# Patient Record
Sex: FEMALE
[De-identification: names, ages, dates, MRNs, and addresses within clinical notes are randomized per-mention and may not be internally consistent; named-entity substitution may affect disease eponyms.]

---

## 2024-01-05 PROBLEM — Z00.00 ENCOUNTER FOR PREVENTIVE HEALTH EXAMINATION: Status: ACTIVE | Noted: 2024-01-05

## 2024-01-31 ENCOUNTER — APPOINTMENT (OUTPATIENT)
Dept: RHEUMATOLOGY | Facility: CLINIC | Age: 74
End: 2024-01-31

## 2024-04-08 ENCOUNTER — APPOINTMENT (OUTPATIENT)
Dept: VASCULAR SURGERY | Facility: CLINIC | Age: 74
End: 2024-04-08
Payer: MEDICARE

## 2024-04-08 VITALS — BODY MASS INDEX: 17.68 KG/M2 | HEIGHT: 66 IN | WEIGHT: 110 LBS

## 2024-04-08 DIAGNOSIS — J44.9 CHRONIC OBSTRUCTIVE PULMONARY DISEASE, UNSPECIFIED: ICD-10-CM

## 2024-04-08 DIAGNOSIS — I10 ESSENTIAL (PRIMARY) HYPERTENSION: ICD-10-CM

## 2024-04-08 DIAGNOSIS — I73.9 PERIPHERAL VASCULAR DISEASE, UNSPECIFIED: ICD-10-CM

## 2024-04-08 DIAGNOSIS — Z87.891 PERSONAL HISTORY OF NICOTINE DEPENDENCE: ICD-10-CM

## 2024-04-08 DIAGNOSIS — Z80.7 FAMILY HISTORY OF OTHER MALIGNANT NEOPLASMS OF LYMPHOID, HEMATOPOIETIC AND RELATED TISSUES: ICD-10-CM

## 2024-04-08 DIAGNOSIS — I82.402 ACUTE EMBOLISM AND THROMBOSIS OF UNSPECIFIED DEEP VEINS OF LEFT LOWER EXTREMITY: ICD-10-CM

## 2024-04-08 DIAGNOSIS — Z80.3 FAMILY HISTORY OF MALIGNANT NEOPLASM OF BREAST: ICD-10-CM

## 2024-04-08 DIAGNOSIS — I25.10 ATHEROSCLEROTIC HEART DISEASE OF NATIVE CORONARY ARTERY W/OUT ANGINA PECTORIS: ICD-10-CM

## 2024-04-08 DIAGNOSIS — E78.5 HYPERLIPIDEMIA, UNSPECIFIED: ICD-10-CM

## 2024-04-08 DIAGNOSIS — Z80.42 FAMILY HISTORY OF MALIGNANT NEOPLASM OF PROSTATE: ICD-10-CM

## 2024-04-08 DIAGNOSIS — Z83.3 FAMILY HISTORY OF DIABETES MELLITUS: ICD-10-CM

## 2024-04-08 PROCEDURE — 99204 OFFICE O/P NEW MOD 45 MIN: CPT

## 2024-04-09 PROBLEM — I10 HYPERTENSION: Status: ACTIVE | Noted: 2024-04-08

## 2024-04-09 PROBLEM — Z80.7 FAMILY HISTORY OF LYMPHOMA: Status: ACTIVE | Noted: 2024-04-08

## 2024-04-09 PROBLEM — E78.5 HYPERLIPIDEMIA: Status: ACTIVE | Noted: 2024-04-08

## 2024-04-09 PROBLEM — Z80.42 FAMILY HISTORY OF MALIGNANT NEOPLASM OF PROSTATE: Status: ACTIVE | Noted: 2024-04-08

## 2024-04-09 PROBLEM — I25.10 CORONARY ARTERY DISEASE: Status: ACTIVE | Noted: 2024-04-08

## 2024-04-09 PROBLEM — Z83.3 FAMILY HISTORY OF DIABETES MELLITUS: Status: ACTIVE | Noted: 2024-04-08

## 2024-04-09 PROBLEM — Z80.3 FAMILY HISTORY OF MALIGNANT NEOPLASM OF BREAST: Status: ACTIVE | Noted: 2024-04-08

## 2024-04-09 PROBLEM — I82.402 LEFT LEG DVT: Status: RESOLVED | Noted: 2024-04-08 | Resolved: 2024-04-09

## 2024-04-09 PROBLEM — I73.9 CLAUDICATION: Status: ACTIVE | Noted: 2024-04-08

## 2024-04-09 PROBLEM — Z87.891 FORMER SMOKER: Status: ACTIVE | Noted: 2024-04-08

## 2024-04-09 PROBLEM — J44.9 COPD (CHRONIC OBSTRUCTIVE PULMONARY DISEASE): Status: ACTIVE | Noted: 2024-04-08

## 2024-04-09 RX ORDER — ASPIRIN 81 MG
81 TABLET, DELAYED RELEASE (ENTERIC COATED) ORAL
Refills: 0 | Status: ACTIVE | COMMUNITY

## 2024-04-09 RX ORDER — CLINDAMYCIN HYDROCHLORIDE 300 MG/1
300 CAPSULE ORAL
Refills: 0 | Status: ACTIVE | COMMUNITY

## 2024-04-09 RX ORDER — ATORVASTATIN CALCIUM 40 MG/1
40 TABLET, FILM COATED ORAL
Refills: 0 | Status: ACTIVE | COMMUNITY

## 2024-04-09 RX ORDER — BUSPIRONE HYDROCHLORIDE 5 MG/1
5 TABLET ORAL
Refills: 0 | Status: ACTIVE | COMMUNITY

## 2024-04-09 RX ORDER — LOSARTAN POTASSIUM 50 MG/1
50 TABLET, FILM COATED ORAL
Refills: 0 | Status: ACTIVE | COMMUNITY

## 2024-04-09 RX ORDER — METOPROLOL SUCCINATE 25 MG/1
25 TABLET, EXTENDED RELEASE ORAL
Refills: 0 | Status: ACTIVE | COMMUNITY

## 2024-04-09 RX ORDER — AZELASTINE HYDROCHLORIDE 0.5 MG/ML
0.05 SOLUTION/ DROPS OPHTHALMIC
Refills: 0 | Status: ACTIVE | COMMUNITY

## 2024-04-09 RX ORDER — CLOPIDOGREL BISULFATE 75 MG/1
75 TABLET, FILM COATED ORAL
Refills: 0 | Status: ACTIVE | COMMUNITY

## 2024-04-09 RX ORDER — CLONIDINE HYDROCHLORIDE 0.1 MG/1
0.1 TABLET ORAL
Refills: 0 | Status: ACTIVE | COMMUNITY

## 2024-04-09 RX ORDER — IPRATROPIUM BROMIDE 42 UG/1
0.06 SPRAY NASAL
Refills: 0 | Status: ACTIVE | COMMUNITY

## 2024-04-09 RX ORDER — METRONIDAZOLE 500 MG/1
500 TABLET ORAL
Refills: 0 | Status: ACTIVE | COMMUNITY

## 2024-04-09 NOTE — PHYSICAL EXAM
[Normal Breath Sounds] : Normal breath sounds [Ankle Swelling Bilaterally] : bilaterally  [Alert] : alert [Oriented to Person] : oriented to person [Oriented to Place] : oriented to place [Oriented to Time] : oriented to time [JVD] : no jugular venous distention  [Ankle Swelling (On Exam)] : not present [de-identified] : Awake and Alert [de-identified] : gangrene to left foot [de-identified] : Appropriate

## 2024-04-09 NOTE — REVIEW OF SYSTEMS
[Limb Pain] : limb pain [Skin Wound] : skin wound [Limb Weakness] : limb weakness [Difficulty Walking] : difficulty walking [Fever] : no fever [Chills] : no chills [Lower Ext Edema] : no lower extremity edema

## 2024-04-09 NOTE — REASON FOR VISIT
[Initial Evaluation] : an initial evaluation [Family Member] : family member [FreeTextEntry1] : gangrene of left foot

## 2024-04-09 NOTE — ASSESSMENT
[FreeTextEntry1] : 72 yo female s/p a fem pop bypass in 2014. She now has an occluded bypass with gangrene of the foot. She underwent evaluation at both [facility] and [facility]. 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 Given her severe CAD and gangrene of the foot an AKA was advised at both facilities. She presents for another opinion. I recommended that she be admitted to the hospital for an AKA. She will consider my recommendation. She was instructed to paint her foot with Betadine.

## 2024-04-09 NOTE — HISTORY OF PRESENT ILLNESS
[FreeTextEntry1] : 74 yo female presents for gangrene of the left foot. The patient was a patient of mine when I practiced at [facility]. She is s/p a fem pop bypass in 2014 which is now occluded. She underwent a vascular evaluation at both [facility] and [facility]. The patient was advised to have an AKA. She presents for another opinion. She reports severe pain in her left foot. She is accompanied by her daughter.

## 2024-04-15 ENCOUNTER — APPOINTMENT (OUTPATIENT)
Dept: VASCULAR SURGERY | Facility: CLINIC | Age: 74
End: 2024-04-15
Payer: MEDICARE

## 2024-04-15 DIAGNOSIS — I70.262 ATHEROSCLEROSIS OF NATIVE ARTERIES OF EXTREMITIES WITH GANGRENE, LEFT LEG: ICD-10-CM

## 2024-04-15 PROCEDURE — 99214 OFFICE O/P EST MOD 30 MIN: CPT

## 2024-04-17 PROBLEM — I70.262 ATHEROSCLEROSIS OF NATIVE ARTERY OF LEFT LOWER EXTREMITY WITH GANGRENE: Status: ACTIVE | Noted: 2024-04-17

## 2024-04-17 NOTE — PHYSICAL EXAM
[JVD] : no jugular venous distention  [Normal Breath Sounds] : Normal breath sounds [Alert] : alert [Oriented to Person] : oriented to person [Oriented to Place] : oriented to place [Oriented to Time] : oriented to time [de-identified] : Awake and Alert [de-identified] : gangrene to left foot [de-identified] : Appropriate

## 2024-04-17 NOTE — REVIEW OF SYSTEMS
[Fever] : no fever [Chills] : no chills [Lower Ext Edema] : no lower extremity edema [Limb Pain] : limb pain [Skin Wound] : skin wound [Limb Weakness] : limb weakness [Difficulty Walking] : difficulty walking

## 2024-04-17 NOTE — ASSESSMENT
[FreeTextEntry1] : 74 yo female s/p a fem pop bypass in 2014. She now has an occluded bypass with gangrene of the foot. She underwent evaluation at both [facility] and [facility]. 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 Given her severe CAD and gangrene of the foot an AKA was advised at both facilities. She presents for another opinion. I recommended that she be admitted to the hospital for an AKA. She has worsening pain and would now like to proceed. She was instructed to go to the ED tomorrow at [facility]. The preoperative and post operative plans were discussed with the patient and her daughter in detail. All of their questions were answered.

## 2024-04-17 NOTE — HISTORY OF PRESENT ILLNESS
[FreeTextEntry1] : 74 yo female presents for gangrene of the left foot. The patient was a patient of mine when I practiced at [facility]. She is s/p a fem pop bypass in 2014 which is now occluded. She underwent a vascular evaluation at both [facility] and [facility]. The patient was advised to have an AKA. She presents for another opinion. She reports severe pain in her left foot. She is accompanied by her daughter.  [de-identified] : 72 yo female with gangrene of the left foot presents to discuss undergoing a left AKA. The patient is accompanied by her daughter. The patient is concerned about her upcoming procedure. She reports that she continues to have a significant amount of pain.

## 2024-04-27 ENCOUNTER — TRANSCRIPTION ENCOUNTER (OUTPATIENT)
Age: 74
End: 2024-04-27

## 2024-05-08 ENCOUNTER — APPOINTMENT (OUTPATIENT)
Dept: VASCULAR SURGERY | Facility: CLINIC | Age: 74
End: 2024-05-08
Payer: MEDICARE

## 2024-05-08 VITALS
HEART RATE: 108 BPM | SYSTOLIC BLOOD PRESSURE: 126 MMHG | DIASTOLIC BLOOD PRESSURE: 78 MMHG | BODY MASS INDEX: 17.68 KG/M2 | HEIGHT: 66 IN | WEIGHT: 110 LBS

## 2024-05-08 DIAGNOSIS — I73.9 PERIPHERAL VASCULAR DISEASE, UNSPECIFIED: ICD-10-CM

## 2024-05-08 DIAGNOSIS — I70.235 ATHEROSCLEROSIS OF NATIVE ARTERIES OF RIGHT LEG WITH ULCERATION OF OTHER PART OF FOOT: ICD-10-CM

## 2024-05-08 DIAGNOSIS — S78.119A COMPLETE TRAUMATIC AMPUTATION AT LVL BETWEEN UNSPECIFIED HIP AND KNEE, INITIAL ENCOUNTER: ICD-10-CM

## 2024-05-08 PROCEDURE — 99214 OFFICE O/P EST MOD 30 MIN: CPT | Mod: 24

## 2024-05-08 PROCEDURE — 93931 UPPER EXTREMITY STUDY: CPT

## 2024-05-08 NOTE — DISCUSSION/SUMMARY
[FreeTextEntry1] : Right lower extremity arterial duplex -  occluded SFA, runoff via diseased popliteal artery and peroneal artery  72 yo female s/p a left AKA. The AKA is well healed and her staples were removed. She has a superficial ulceration of the dorsum of the right foot. She has a 2+ femoral pulse and no palpable popliteal or pedal pulses. She underwent an arterial duplex which demonstrated a occluded SFA. I recommended that she undergo an angiogram and possible intervention vs bypass. The risks and benefits of the procedure were discussed with the patient who agrees to proceed.   Will schedule for angiogram and possible intervention. Continue Aspirin and Plavix.  Pain management consult for phantom limb pain.

## 2024-05-08 NOTE — REVIEW OF SYSTEMS
[Fever] : no fever [Chills] : no chills [As Noted in HPI] : as noted in HPI [Difficulty Walking] : difficulty walking

## 2024-05-08 NOTE — REASON FOR VISIT
[de-identified] : s/p left AKA  [de-identified] : 74 yo female s/p a left AKA for unreconstructible PAD and gangrene of the left foot. She reports that she is having a large amount of phantom pain. She denies fever or chills. She denies drainage from the AKA. She reports that she developed a new ulceration on the right foot.

## 2024-05-08 NOTE — PHYSICAL EXAM
[2+] : left 2+ [Ankle Swelling (On Exam)] : present [Ankle Swelling On The Right] : mild [Alert] : alert [Oriented to Person] : oriented to person [Oriented to Place] : oriented to place [Oriented to Time] : oriented to time [de-identified] : Awake and Alert [de-identified] : left AKA incision healed - staples removed, dorsum of right foot with superficial ulceration [de-identified] : Appropriate

## 2024-05-09 PROBLEM — I70.235 ATHEROSCLEROSIS OF NATIVE ARTERY OF RIGHT LOWER EXTREMITY WITH ULCERATION OF OTHER PART OF FOOT: Status: ACTIVE | Noted: 2024-05-08
